# Patient Record
Sex: MALE | Race: BLACK OR AFRICAN AMERICAN | Employment: OTHER | URBAN - METROPOLITAN AREA
[De-identification: names, ages, dates, MRNs, and addresses within clinical notes are randomized per-mention and may not be internally consistent; named-entity substitution may affect disease eponyms.]

---

## 2017-08-26 ENCOUNTER — HOSPITAL ENCOUNTER (EMERGENCY)
Age: 67
Discharge: HOME | End: 2017-08-26
Attending: EMERGENCY MEDICINE
Payer: MEDICARE

## 2017-08-26 ENCOUNTER — APPOINTMENT (OUTPATIENT)
Dept: GENERAL RADIOLOGY | Age: 67
End: 2017-08-26
Attending: EMERGENCY MEDICINE
Payer: MEDICARE

## 2017-08-26 VITALS
HEART RATE: 61 BPM | DIASTOLIC BLOOD PRESSURE: 111 MMHG | SYSTOLIC BLOOD PRESSURE: 221 MMHG | RESPIRATION RATE: 16 BRPM | TEMPERATURE: 97.3 F | OXYGEN SATURATION: 100 % | WEIGHT: 190 LBS
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
## 2017-08-26 DIAGNOSIS — R03.0 ELEVATED BLOOD PRESSURE READING: ICD-10-CM

## 2017-08-26 PROCEDURE — 74011250637 HC RX REV CODE- 250/637: Performed by: EMERGENCY MEDICINE

## 2017-08-26 PROCEDURE — 73130 X-RAY EXAM OF HAND: CPT

## 2017-08-26 PROCEDURE — 99283 EMERGENCY DEPT VISIT LOW MDM: CPT

## 2017-08-26 RX ORDER — OXYCODONE AND ACETAMINOPHEN 5; 325 MG/1; MG/1
1 TABLET ORAL
Qty: 6 TAB | Refills: 0 | Status: SHIPPED | OUTPATIENT
Start: 2017-08-26

## 2017-08-26 RX ORDER — HYDROCHLOROTHIAZIDE 25 MG/1
25 TABLET ORAL
Status: COMPLETED | OUTPATIENT
Start: 2017-08-26 | End: 2017-08-26

## 2017-08-26 RX ORDER — DOXYCYCLINE 100 MG/1
100 CAPSULE ORAL 2 TIMES DAILY
Qty: 14 CAP | Refills: 0 | Status: SHIPPED | OUTPATIENT
Start: 2017-08-26 | End: 2017-09-02

## 2017-08-26 RX ORDER — CLONIDINE HYDROCHLORIDE 0.2 MG/1
0.2 TABLET ORAL 2 TIMES DAILY
COMMUNITY

## 2017-08-26 RX ORDER — DOXYCYCLINE 100 MG/1
100 CAPSULE ORAL ONCE
Status: COMPLETED | OUTPATIENT
Start: 2017-08-26 | End: 2017-08-26

## 2017-08-26 RX ORDER — HYDROCHLOROTHIAZIDE 12.5 MG/1
12.5 TABLET ORAL DAILY
COMMUNITY

## 2017-08-26 RX ORDER — CLONIDINE HYDROCHLORIDE 0.1 MG/1
0.2 TABLET ORAL
Status: COMPLETED | OUTPATIENT
Start: 2017-08-26 | End: 2017-08-26

## 2017-08-26 RX ORDER — OXYCODONE AND ACETAMINOPHEN 5; 325 MG/1; MG/1
1 TABLET ORAL
Status: COMPLETED | OUTPATIENT
Start: 2017-08-26 | End: 2017-08-26

## 2017-08-26 RX ADMIN — CLONIDINE HYDROCHLORIDE 0.2 MG: 0.1 TABLET ORAL at 04:01

## 2017-08-26 RX ADMIN — HYDROCHLOROTHIAZIDE 25 MG: 25 TABLET ORAL at 04:02

## 2017-08-26 RX ADMIN — DOXYCYCLINE 100 MG: 100 CAPSULE ORAL at 03:51

## 2017-08-26 RX ADMIN — OXYCODONE HYDROCHLORIDE AND ACETAMINOPHEN 1 TABLET: 5; 325 TABLET ORAL at 03:22

## 2017-08-26 NOTE — ED PROVIDER NOTES
HPI Comments: 79 y/o male with PMH of htn who presents after being stung by a stingray. Pt was fishing, got stingray on line, tail hit pt in L hand, between 4th and 5th digits. Happened 3 hrs ago. 1hr ago, hand swelling and redness and increased pain. No numbness or tingling. No other complaints. Has not taken any meds. No alleviating or aggravating factors. Tetanus utd. Patient is a 78 y.o. male presenting with hand swelling. Hand Swelling    Pertinent negatives include no numbness. Past Medical History:   Diagnosis Date    Hypertension        History reviewed. No pertinent surgical history. History reviewed. No pertinent family history. Social History     Social History    Marital status: SINGLE     Spouse name: N/A    Number of children: N/A    Years of education: N/A     Occupational History    Not on file. Social History Main Topics    Smoking status: Never Smoker    Smokeless tobacco: Never Used    Alcohol use Yes      Comment: occ    Drug use: No    Sexual activity: Not on file     Other Topics Concern    Not on file     Social History Narrative    No narrative on file         ALLERGIES: Review of patient's allergies indicates no known allergies. Review of Systems   Constitutional: Negative for chills and fever. Eyes: Negative for visual disturbance. Respiratory: Negative for shortness of breath. Cardiovascular: Negative for chest pain. Musculoskeletal: Positive for arthralgias. Skin:        Hand swelling   Neurological: Negative for numbness and headaches. All other systems reviewed and are negative. Vitals:    08/26/17 0232   BP: (!) 213/115   Pulse: 61   Resp: 16   Temp: 97.7 °F (36.5 °C)   SpO2: 100%   Weight: 86.2 kg (190 lb)            Physical Exam   Constitutional: He is oriented to person, place, and time. He appears well-developed and well-nourished. HENT:   Head: Normocephalic and atraumatic. Neck: Neck supple. No JVD present. Musculoskeletal: He exhibits no edema. Hands:  Abrasions as drawn  Moderate swelling lateral L hand on dorsal aspect  Cap refill 1 sec  Able to fully extend and flex digits  Is able to make a fist  Radial pulse 2+   Neurological: He is alert and oriented to person, place, and time. Skin: Skin is warm and dry. No erythema. Psychiatric: Judgment normal.        MDM  Number of Diagnoses or Management Options  Contact with stingray as cause of accidental injury:   Elevated blood pressure reading:   Diagnosis management comments: 77 yo male presents after stingray. xr to eval for fx, retained fb    Will give prophylactic abx, treat pain. Amount and/or Complexity of Data Reviewed  Tests in the radiology section of CPT®: ordered and reviewed      ED Course       Procedures  Vitals:  Patient Vitals for the past 12 hrs:   Temp Pulse Resp BP SpO2   08/26/17 0232 97.7 °F (36.5 °C) 61 16 (!) 213/115 100 %       Medications ordered:   Medications   doxycycline (MONODOX) capsule 100 mg (not administered)   oxyCODONE-acetaminophen (PERCOCET) 5-325 mg per tablet 1 Tab (1 Tab Oral Given 8/26/17 0322)           X-Ray, CT or other radiology findings or impressions:  XR HAND LT MIN 3 V         Preliminary Result  IMPRESSION:    No acute process. Interpreted by Arvin Nevarez DO 3:49 AM  No foreign body found. Discussed results with pt, plan for dc home with abx and pain meds. discussed return precautions. Pt noted to have elevated BP. Pt is asymptomatic and was referred to PCP for follow up. Pt reports missing dose of meds today, will give dose in ER. Disposition:  Diagnosis:   1. Contact with stingray as cause of accidental injury    2.  Elevated blood pressure reading        Disposition: Discharged    Follow-up Information     Follow up With Details Comments Contact Info    Provider Unknown Call in 2 days As needed Patient not available to ask             Patient's Medications   Start Taking DOXYCYCLINE (MONODOX) 100 MG CAPSULE    Take 1 Cap by mouth two (2) times a day for 7 days. OXYCODONE-ACETAMINOPHEN (PERCOCET) 5-325 MG PER TABLET    Take 1 Tab by mouth every six (6) hours as needed for Pain. Max Daily Amount: 4 Tabs. Continue Taking    CLONIDINE HCL (CATAPRES) 0.2 MG TABLET    Take 0.2 mg by mouth two (2) times a day. HYDROCHLOROTHIAZIDE (HYDRODIURIL) 12.5 MG TABLET    Take 12.5 mg by mouth daily. These Medications have changed    No medications on file   Stop Taking    No medications on file     [facility] acting as a scribe for and in the presence of Charmayne Mettle, DO      August 26, 2017 at 3:50 AM       Provider Attestation:      I personally performed the services described in the documentation, reviewed the documentation, as recorded by the scribe in my presence, and it accurately and completely records my words and actions.  August 26, 2017 at 3:50 AM - Charmayne Mettle, DO

## 2017-08-26 NOTE — ED NOTES
/111, MD notified. New orders received. Medicated per MAR. V.o. to continue d/c pt after med adm. I have reviewed discharge instructions with the patient. The patient verbalized understanding. Patient received two presription(s). Patient told not to drive with medication. Patient was ambulatory upon discharge. Patient was in stable condition. Patient was accompanied with friend.

## 2017-08-26 NOTE — ED TRIAGE NOTES
PT was fishing and was stabbed with a sting ray susan to left hand between fourth and fifth finger. Mild swelling to the area. BP elevated in triage 213/115- denies chest pain, dizziness, sob. PT states he is due for his evening BP medication and that his BP is never this high.  Pt states he thinks it is from pain

## 2017-08-26 NOTE — DISCHARGE INSTRUCTIONS
Marine Stings and [address] [place] [address] stings make raised, red welts that develop along the site of the sting. The welts may last for 1 to 2 weeks, and itchy skin rashes may appear 1 to 4 weeks after the sting. [place] man-[place]-war stings result in a red line with small white sores. In severe cases, blisters and welts that look like a string of beads may appear. Seabather's eruption is a rash that develops from the stings of jellyfish or sea anemone larvae. The rash can be itchy and annoying. It usually goes away without medical treatment in 10 to 14 days. Coral scrapes and cuts may take weeks and sometimes even months to heal completely. Your home treatment depends on what type of sting or scrape you have and how severe it is. You may need to wash the sting or scrape or change a bandage. Your doctor may give you medicine to take or to put on the affected area. Follow-up care is a key part of your treatment and safety. Be sure to make and go to all appointments, and call your doctor if you are having problems. It's also a good idea to know your test results and keep a list of the medicines you take. How can you care for yourself at home? Jellyfish and [place] man-of-war stings  · Take an over-the-counter antihistamine, such as Benadryl or Claritin, or apply 1% hydrocortisone cream to help control itching. Read and follow all instructions on the label. · Put ice or a cold pack on the area for 10 to 20 minutes at a time. Put a thin cloth between the ice and your skin. This may help relieve pain. · If your doctor told you how to care for your sting, follow your doctor's instructions. If you did not get instructions, follow this general advice:  ¨ Wash the sting with clean water 2 times a day. Don't use hydrogen peroxide or alcohol, which can slow healing.   ¨ You may cover the area with a thin layer of petroleum jelly, such as Vaseline, and a nonstick bandage. ¨ Apply more petroleum jelly and replace the bandage as needed. · Take pain medicines exactly as directed. ¨ If the doctor gave you a prescription medicine for pain, take it as prescribed. ¨ If you are not taking a prescription pain medicine, ask your doctor if you can take an over-the-counter medicine. Seabather's eruption  · Take an over-the-counter antihistamine, such as Benadryl or Claritin, or apply 1% hydrocortisone cream to help control itching. Read and follow all instructions on the label. · Put ice or a cold pack on the area for 10 to 20 minutes at a time. Put a thin cloth between the ice and your skin. This may help relieve pain. · Be safe with medicines. Take pain medicines exactly as directed. ¨ If the doctor gave you a prescription medicine for pain, take it as prescribed. ¨ If you are not taking a prescription pain medicine, ask your doctor if you can take an over-the-counter medicine. · Wash the rash with soap and water daily. Coral scrapes and cuts  · If your doctor told you how to care for your wound, follow your doctor's instructions. If you did not get instructions, follow this general advice:  ¨ Wash the wound with clean water 2 times a day. Don't use hydrogen peroxide or alcohol, which can slow healing. ¨ You may cover the area with a thin layer of petroleum jelly, such as Vaseline, and a nonstick bandage. ¨ Apply more petroleum jelly and replace the bandage as needed. · Take pain medicines exactly as directed. ¨ If the doctor gave you a prescription medicine for pain, take it as prescribed. ¨ If you are not taking a prescription pain medicine, ask your doctor if you can take an over-the-counter medicine. When should you call for help? Call 911 anytime you think you may need emergency care. For example, call if:  · You passed out (lost consciousness). · Your tongue or throat is swelling. · You have breathing problems or wheezing.   Call your doctor now or seek immediate medical care if:  · You are dizzy or lightheaded, or you feel like you may faint. · You have signs of infection, such as:  ¨ Increased pain, swelling, warmth, or redness around the area. ¨ Red streaks leading from the area. ¨ Pus draining from the area. ¨ A fever. · The sting or scrape starts to bleed, and blood soaks through the bandage. Oozing small amounts of blood is normal.  Watch closely for changes in your health, and be sure to contact your doctor if the rash, sting, or scrape is not getting better each day. Where can you learn more? Go to http://coleman-tony.info/. Enter J292 in the search box to learn more about \"Marine Stings and Scrapes: Care Instructions. \"  Current as of: March 20, 2017  Content Version: 11.3  © 9884-8390 [employer]. Care instructions adapted under license by [employer] (which disclaims liability or warranty for this information). If you have questions about a medical condition or this instruction, always ask your healthcare professional. [address] any warranty or liability for your use of this information.

## 2017-08-26 NOTE — ED NOTES
Pt states they were fishing four hours ago when a stingray stung his L hand. No active bleeding noted. No foreign object noted. Slight swelling with dried blood to dorsal aspect of L hand. CRT<sec. Full ROM to all digits.